# Patient Record
Sex: FEMALE | Race: WHITE | NOT HISPANIC OR LATINO | Employment: FULL TIME | URBAN - METROPOLITAN AREA
[De-identification: names, ages, dates, MRNs, and addresses within clinical notes are randomized per-mention and may not be internally consistent; named-entity substitution may affect disease eponyms.]

---

## 2020-06-01 ENCOUNTER — HOSPITAL ENCOUNTER (EMERGENCY)
Facility: HOSPITAL | Age: 50
Discharge: HOME | End: 2020-06-01
Attending: EMERGENCY MEDICINE
Payer: COMMERCIAL

## 2020-06-01 VITALS
HEART RATE: 91 BPM | HEIGHT: 68 IN | TEMPERATURE: 99 F | RESPIRATION RATE: 18 BRPM | OXYGEN SATURATION: 99 % | WEIGHT: 120 LBS | SYSTOLIC BLOOD PRESSURE: 153 MMHG | BODY MASS INDEX: 18.19 KG/M2 | DIASTOLIC BLOOD PRESSURE: 98 MMHG

## 2020-06-01 DIAGNOSIS — S82.839A AVULSION FRACTURE OF DISTAL END OF FIBULA: Primary | ICD-10-CM

## 2020-06-01 DIAGNOSIS — S99.912A LEFT ANKLE INJURY, INITIAL ENCOUNTER: ICD-10-CM

## 2020-06-01 DIAGNOSIS — S99.922A FOOT INJURY, LEFT, INITIAL ENCOUNTER: ICD-10-CM

## 2020-06-01 PROCEDURE — 99284 EMERGENCY DEPT VISIT MOD MDM: CPT | Mod: ,,, | Performed by: PHYSICIAN ASSISTANT

## 2020-06-01 PROCEDURE — 25000003 PHARM REV CODE 250: Performed by: EMERGENCY MEDICINE

## 2020-06-01 PROCEDURE — 99284 PR EMERGENCY DEPT VISIT,LEVEL IV: ICD-10-PCS | Mod: ,,, | Performed by: PHYSICIAN ASSISTANT

## 2020-06-01 PROCEDURE — 99283 EMERGENCY DEPT VISIT LOW MDM: CPT | Mod: 25

## 2020-06-01 RX ORDER — IBUPROFEN 600 MG/1
600 TABLET ORAL
Status: COMPLETED | OUTPATIENT
Start: 2020-06-01 | End: 2020-06-01

## 2020-06-01 RX ADMIN — IBUPROFEN 600 MG: 600 TABLET, FILM COATED ORAL at 07:06

## 2020-06-01 NOTE — PROVIDER PROGRESS NOTES - EMERGENCY DEPT.
Emergency Department TeleTRIAGE Encounter Note      CHIEF COMPLAINT    Chief Complaint   Patient presents with    Ankle Injury     L ankle missed step,        VITAL SIGNS   Initial Vitals [06/01/20 1838]   BP Pulse Resp Temp SpO2   (!) 155/82 (!) 112 18 99 °F (37.2 °C) 98 %      MAP       --            ALLERGIES    Review of patient's allergies indicates:  No Known Allergies    PROVIDER TRIAGE NOTE  The patient inverted her left foot, injuring her left ankle and left foot.  She has taken nothing for the pain.  There is swelling to the lateral malleolus.      ORDERS  Labs Reviewed - No data to display    ED Orders (720h ago, onward)    Start Ordered     Status Ordering Provider    06/01/20 1845 06/01/20 1844  Ice to affected area  Once      Ordered NEFTALY THOMPSON    06/01/20 1845 06/01/20 1844  Apply ace wrap  Once      Ordered NEFTALY THOMPSON    06/01/20 1845 06/01/20 1844  ibuprofen tablet 600 mg  ED 1 Time      Ordered NEFTALY THOMPSON    06/01/20 1844 06/01/20 1844  X-Ray Ankle Complete Left  1 time imaging      Ordered NEFTALY THOMPSON    06/01/20 1844 06/01/20 1844  X-Ray Foot Complete Left  1 time imaging      Ordered NEFTALY THOMPSON            Virtual Visit Note: The provider triage portion of this emergency department evaluation and documentation was performed via [employer], a HIPAA-compliant telemedicine application, in concert with a tele-presenter in the room. A face to face patient evaluation with one of my colleagues will occur once the patient is placed in an emergency department room.      DISCLAIMER: This note was prepared with [employer] voice recognition transcription software. Garbled syntax, mangled pronouns, and other bizarre constructions may be attributed to that software system.

## 2020-06-02 NOTE — ED PROVIDER NOTES
Encounter Date: 6/1/2020       History     Chief Complaint   Patient presents with    Ankle Injury     L ankle missed step,      Patient is a 50-year-old female with no known past medical history who presents to the emergency department with complaints of left lateral ankle pain that began about 3 hr prior to arrival.  Patient reports that she stepped incorrectly on a plank while walking outside in her barn which caused her ankle to abruptly invert.  She denies fall or head injury.  She is not on blood thinners.  She denies knee pain.  She has not taken any medication prior to arrival.  She reports pain is worse with eversion and inversion of the ankle as well as ambulation.  She denies other worsening or alleviating factors.  She denies fevers, chills, cough, shortness of breath, chest pain, nausea, vomiting, diarrhea.  This is the extent of the patient's complaints.        Review of patient's allergies indicates:  No Known Allergies  No past medical history on file.  No past surgical history on file.  No family history on file.  Social History     Tobacco Use    Smoking status: Not on file   Substance Use Topics    Alcohol use: Not on file    Drug use: Not on file     Review of Systems   Constitutional: Negative for chills and fever.   HENT: Negative for congestion and sore throat.    Respiratory: Negative for cough and shortness of breath.    Cardiovascular: Negative for chest pain.   Gastrointestinal: Negative for diarrhea, nausea and vomiting.   Genitourinary: Negative for dysuria.   Musculoskeletal: Positive for arthralgias. Negative for back pain.   Skin: Negative for rash and wound.   Neurological: Negative for weakness and headaches.   Hematological: Does not bruise/bleed easily.       Physical Exam     Initial Vitals [06/01/20 1838]   BP Pulse Resp Temp SpO2   (!) 155/82 (!) 112 18 99 °F (37.2 °C) 98 %      MAP       --         Physical Exam    Nursing note and vitals reviewed.  Constitutional: She  appears well-developed and well-nourished. She is not diaphoretic. No distress.   HENT:   Head: Normocephalic and atraumatic.   Mouth/Throat: Oropharynx is clear and moist.   Eyes: Conjunctivae and EOM are normal. Pupils are equal, round, and reactive to light.   Neck: Normal range of motion. Neck supple.   Cardiovascular: Normal rate, regular rhythm, normal heart sounds and intact distal pulses. Exam reveals no gallop and no friction rub.    No murmur heard.  Pulmonary/Chest: Breath sounds normal. She has no wheezes. She has no rhonchi. She has no rales.   Abdominal: Soft. Bowel sounds are normal. There is no tenderness.   Musculoskeletal: Normal range of motion. She exhibits edema and tenderness (Tenderness to palpation of anterior lateral malleolus).   Edema and ecchymosis to lateral malleolus   Neurological: She is alert and oriented to person, place, and time. She has normal strength. No cranial nerve deficit or sensory deficit. GCS score is 15. GCS eye subscore is 4. GCS verbal subscore is 5. GCS motor subscore is 6.   Skin: Skin is warm and dry. Capillary refill takes less than 2 seconds.   Psychiatric: She has a normal mood and affect. Her behavior is normal. Judgment and thought content normal.         ED Course   Procedures  Labs Reviewed - No data to display       Imaging Results          X-Ray Foot Complete Left (Final result)  Result time 06/01/20 19:32:31    Final result by Nic Calvert MD (06/01/20 19:32:31)                 Impression:      1. No acute displaced fracture or dislocation of the foot.  2. Cortical irregularity involving the distal aspect of the fibula, not readily apparent on the accompanying ankle radiograph may reflect small fibular avulsion.  Correlation is advised.      Electronically signed by: Nic Calvert MD  Date:    06/01/2020  Time:    19:32             Narrative:    EXAMINATION:  XR FOOT COMPLETE 3 VIEW LEFT    CLINICAL HISTORY:  .  Unspecified injury of left foot,  initial encounter    TECHNIQUE:  AP, lateral and oblique views of the left foot were performed.    COMPARISON:  06/01/2020    FINDINGS:  Three views left foot.    No acute displaced fracture or dislocation of the foot.  No radiopaque foreign body.  There is edema overlying the lateral malleolus.  Not readily apparent on the ankle radiograph, is irregular ossific/calcific density at the distal aspect of the fibula.  Given the edema in the region, small fibular avulsion injury is not excluded.                               X-Ray Ankle Complete Left (Edited Result - FINAL)  Result time 06/01/20 19:33:27    Addendum 1 of 1 by Nic Calvert MD (06/01/20 19:33:27)      Please note, following this dictation, radiograph of the foot became available for review.  On that examination, there is calcific/ossific density along the distal most aspect of the fibula.  Given this, small fibular avulsion injury is not excluded noting edema overlies the region.  Correlation is advised.  Please see separately dictated radiograph of the foot.      Electronically signed by: Nic Calvert MD  Date:    06/01/2020  Time:    19:33               Final result by Nic Calvert MD (06/01/20 19:27:29)                 Impression:      1. No acute displaced fracture or dislocation of the ankle noting nonspecific edema overlying the lateral malleolus.      Electronically signed by: Nic Calvert MD  Date:    06/01/2020  Time:    19:27             Narrative:    EXAMINATION:  XR ANKLE COMPLETE 3 VIEW LEFT    CLINICAL HISTORY:  Unspecified injury of left ankle, initial encounter    TECHNIQUE:  AP, lateral and oblique views of the left ankle were performed.    COMPARISON:  None    FINDINGS:  Three views left ankle.    No acute displaced fracture or dislocation of the ankle.  No radiopaque foreign body.  There is edema overlying the lateral malleolus.                                 Medical Decision Making:   History:   Old Medical Records:  "I decided to obtain old medical records.  Initial Assessment:   Emergent evaluation of a 50-year-old female who presents to the emergency department with complaints of pain to the lateral aspect of her ankle that began about 3 hr prior to arrival.  Patient reports that she stepped incorrectly on a plank of wood outside which caused her ankle to abruptly invert.  She reports hearing a crack."  She has not taken any medication prior to arrival.  Patient is afebrile, hemodynamically stable, and nontoxic appearing.  X-ray foot and ankle ordered by tele triage provider.  Differential Diagnosis:   Differential diagnosis includes but is not limited to fracture, dislocation, musculoskeletal pain, ligamentous injury.  ED Management:  Patient given ibuprofen, ice, compression dressing as ordered by tele triage provider.  X-ray does reveal avulsion fracture of the distal fibula.  I recommend surgical boot and follow-up with ortho in 1 week.  Patient declined surgical boot and would prefer crutches.  I instruct her to weightbear as tolerated.  I will order crutches in the emergency department.  She declines need for narcotic pain medication at home.  She will continue ibuprofen and Tylenol as needed for pain.  Strict return precautions given.  She voices understanding.  All questions answered. The patient was instructed to follow up with Ortho in 7 days or to return to the emergency department for worsening symptoms. The treatment plan was discussed with the patient who demonstrated understanding and comfort with plan. The patient's history, physical exam, and plan of care was discussed with and agreed upon with my supervising physician.                                    Clinical Impression:     1. Avulsion fracture of distal end of fibula    2. Left ankle injury, initial encounter    3. Foot injury, left, initial encounter                ED Disposition Condition    Discharge Stable        ED Prescriptions     None    "     Follow-up Information     Follow up With Specialties Details Why Contact Info Additional Information    [facility] Emergency Medicine Go to  If symptoms worsen [address] [address] [phone]     [facility] Orthopedics In 1 week  [address]  [facility] [address] [phone] [facility] - 5th Floor                                     Brenda Muniz PA-C  06/02/20 0143

## 2020-06-02 NOTE — ED NOTES
Ankle wrapped and crutches given to pt with instructions. Pt instructed to follow up with ortho. Pt states understanding.

## 2020-06-02 NOTE — DISCHARGE INSTRUCTIONS
Please use crutches as needed. Weight bare as tolerated. Take ibuprofen and tylenol as needed for pain. Follow up with ortho in one week or return to the ED for new or worsening symptoms.

## 2020-06-24 ENCOUNTER — TELEPHONE (OUTPATIENT)
Dept: ORTHOPEDICS | Facility: CLINIC | Age: 50
End: 2020-06-24

## 2020-06-24 NOTE — TELEPHONE ENCOUNTER
Received a return call from pt.  Explained that I was able to secure a sooner appointment for her tomorrow morning with Dr Ortiz at [facility].   Pt verbalized understanding  Pt has a cam boot at home   She will bring it with her to the appointment.   Pt has not used crutches provided by ED.   Pt is wrapping ankle

## 2020-06-24 NOTE — TELEPHONE ENCOUNTER
Left voice mail for pt to return my call     Was able to obtain a sooner appointment for pt with Dr Ortiz at [facility].     Pending a return call from pt.

## 2020-06-24 NOTE — TELEPHONE ENCOUNTER
----- Message from Fide Arguello LPN sent at 6/24/2020  1:24 PM CDT -----    ----- Message -----  From: Mehreen Wells  Sent: 6/24/2020  12:37 PM CDT  To: [facility] Triage        Calls in to be seen for a fracture she sustained on 6/11 - appt had been scheduled for today w/Lito Rajput - appt canceled patient was told they dont see for her type of injury and she would need appt at [facility]. 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  Please contact patient to advise @# [phone]     If she needs to keep appt tomorrow I can call her back - just didn't know if you would have wanted to see this info

## 2020-06-25 ENCOUNTER — HOSPITAL ENCOUNTER (OUTPATIENT)
Dept: RADIOLOGY | Facility: HOSPITAL | Age: 50
Discharge: HOME | End: 2020-06-25
Attending: ORTHOPAEDIC SURGERY
Payer: COMMERCIAL

## 2020-06-25 ENCOUNTER — OFFICE VISIT (OUTPATIENT)
Dept: ORTHOPEDICS | Facility: CLINIC | Age: 50
End: 2020-06-25
Payer: COMMERCIAL

## 2020-06-25 DIAGNOSIS — S93.402D INVERSION SPRAIN OF LEFT ANKLE, SUBSEQUENT ENCOUNTER: ICD-10-CM

## 2020-06-25 DIAGNOSIS — M25.572 LEFT ANKLE PAIN, UNSPECIFIED CHRONICITY: ICD-10-CM

## 2020-06-25 DIAGNOSIS — M25.572 LEFT ANKLE PAIN, UNSPECIFIED CHRONICITY: Primary | ICD-10-CM

## 2020-06-25 PROCEDURE — 73610 X-RAY EXAM OF ANKLE: CPT | Mod: TC,LT

## 2020-06-25 PROCEDURE — 73610 XR ANKLE COMPLETE 3 VIEW LEFT: ICD-10-PCS | Mod: 26,LT,, | Performed by: RADIOLOGY

## 2020-06-25 PROCEDURE — 99204 OFFICE O/P NEW MOD 45 MIN: CPT | Mod: S$GLB,,, | Performed by: ORTHOPAEDIC SURGERY

## 2020-06-25 PROCEDURE — 99204 PR OFFICE/OUTPT VISIT, NEW, LEVL IV, 45-59 MIN: ICD-10-PCS | Mod: S$GLB,,, | Performed by: ORTHOPAEDIC SURGERY

## 2020-06-25 PROCEDURE — 73610 X-RAY EXAM OF ANKLE: CPT | Mod: 26,LT,, | Performed by: RADIOLOGY

## 2020-06-25 PROCEDURE — 99999 PR PBB SHADOW E&M-EST. PATIENT-LVL III: ICD-10-PCS | Mod: PBBFAC,,, | Performed by: ORTHOPAEDIC SURGERY

## 2020-06-25 PROCEDURE — 99999 PR PBB SHADOW E&M-EST. PATIENT-LVL III: CPT | Mod: PBBFAC,,, | Performed by: ORTHOPAEDIC SURGERY

## 2020-06-25 NOTE — PATIENT INSTRUCTIONS
Today, you saw Dr. Ortiz and were seen for a left ankle sprain.  Your x-rays were positive for TINY evette fracture.  A review of ankle sprains is provided below.  We discussed that most ankle sprains (even with torn ligaments or tiny pulled off pieces of bone) heal without needing further surgical treatment.  An ankle sprain still needs to be treated and rehabilitated though.  Treatment and rehabilitation progress through phases, which are outlined below.  FIRST, we focus on pain and improving swelling/treating inflammation.  SECOND, we focus on regaining motion.  LASTLY, we work on strengthening, balance and return to activities.  This process can take 2-3 months before you are returning to activities without dysfunction.  In rare cases, progressing through the home exercise program you are provided and returning to activities stalls and a formal referral to physical therapy is needed.    We decided the next step(s) in in treatment is/are   1.  Weight bearing: Weight bearing as tolerated on left lower extremity; increase activities using pain as your guide   2.  Immobilization: Discontinue boot and start using an ankle brace; if the one you ordered doesn't fit, send me message and I can order you one thru [facility] and it is billed to insurance   3.  Therapy: AOFAS ankle sprain protocol exercises provided   o https://www.footcaremd.org/resources/how-to-help/how-to-stretch-your-ankle-after-a-sprain  o https://www.footcaremd.org/resources/how-to-help/how-to-strengthen-your-ankle-after-a-sprain   4.  Symptomatic treatment: Over the counter anti-inflammatories as needed; ice and elevation as needed     Thank you for allowing me to participate in your care.  We will see you back as needed to discuss next steps in treatment if current treatment plan does not provide relief.    WHAT IS AN ANKLE SPRAIN?  An ankle sprain refers to tearing of the ligaments of the ankle. The most common ankle sprain occurs on the  lateral (outside) part of the ankle. There's a good chance you may have sprained your ankle at some point while playing sports or stepping on an uneven surface -- some 25,000 people do it every day. It can happen in the setting of an ankle fracture (when the bones of the ankle also break). Most commonly, however, it occurs in isolation.    Symptoms  Patients report pain after having twisted an ankle. This usually occurs due to an inversion injury, which means the foot rolls underneath the ankle or leg. It commonly occurs during sports. Patients will complain of pain on the outside of their ankle and various degrees of swelling and bruising. Depending on the severity of the sprain, a person may or may not be able to put weight on the foot.    Causes    As noted above, these injuries occur when the ankle is twisted underneath the leg, called inversion. Risk factors are activities, such as jumping/cutting sports like basketball and soccer, in which an athlete can come down on and turn the ankle or step on an opponent's foot.     Some people are predisposed to ankle sprains. These injuries are more common in people with a high arched foot. This is because it is easier to turn on the ankle.    In those who have had a severe sprain in the past, it is also easier to turn the ankle and sustain a new sprain. Therefore, one of the risk factors of spraining the ankle is a history of a previous sprain or instability (looseness in the ankle). Those who have weak muscles, especially the peroneals that run along the outside of the ankle that provide muscular support to the ankle, may be more predisposed.    Anatomy    There are multiple ligaments in the ankle. Ligaments in general are the structures that connect bone to bone. Tendons, on the other hand, connect muscle to bone and allow the muscles to exert force on their associated bones. In the case of an ankle sprain, there are several commonly sprained ligaments. The two most  important are the following:Ligaments in the ankle    The anterior talofibular ligament (ATFL), which connects the talus to the fibula on the outside of the ankle.    The calcaneal fibular ligament (CFL), which connects the fibula to the calcaneus.        Injuries to the above ligaments must be differentiated from the high ankle sprain, which is a more severe type of ankle sprain involving ligaments that connect the tibia (long bone on the inside of the leg) to the fibula (bone on the outside of the leg).    Diagnosis  Ankle sprains can be diagnosed fairly easily given that they are common injuries. Pain on the outside of the ankle, tenderness and swelling, and an ankle with an inversion-type injury may indicate a sprain. In these patients, normal X-rays also suggest that the bone has not been broken and instead the ankle ligaments have been torn or sprained.     It is very important, however, not to simply regard any injury as an ankle sprain because other injuries can occur as well. For example, the peroneal tendons can be torn. There also can be fractures in other bones around the ankle, including the fifth metatarsal or the calcaneus (heel bone). See a foot and ankle orthopaedic surgeon in your area for a thorough examination.    In very severe cases, an MRI may be warranted to rule out other problems in the ankle such as damage to the cartilage. An MRI typically is not necessary to diagnose a sprain and is reserved for patients who are slow to recover and do not follow the normal progression of healing.    Treatments  Surgery is not required in the vast majority of ankle sprains. Even in severe sprains, these ligaments will heal without surgery if treated appropriately. The grade of the sprain will dictate treatment. Sprains are traditionally classified into Grade 1 (mild), Grade 2 (moderate), and Grade 3 (severe) injuries. Perhaps more important, however, is the patient's ability to bear weight. Those that can  bear weight even after the injury are likely to return very quickly to normal activities. Those who cannot walk may need to be immobilized.     Treating your sprained ankle properly may prevent chronic pain and instability. For treatment of ankle sprains, I recommend you follow the R.I.C.E. guidelines:    Rest your ankle by not walking on it until you can do it comfortably (this may require a boot brace or lace up brace).  Ice it to keep the swelling down. Don't put ice directly on the skin (use a thin piece of cloth between the ice bag and skin) and don't ice more than 20 minutes at a time to avoid frostbite.  Compressive bandages immobilize and support your injury.  Elevate your ankle above your heart level for 48 hours. The swelling usually goes down within a few days.    Severe ligament injuries often require rehabilitation. The goals of therapy are to allow for optimal healing of the ligaments, return to sport/work as quickly as possible, and prevent re-injury.  In some cases, a referral to physical therapy is needed.    Recovery  There are 3 phases of recovery:    Phase 1 includes resting, protecting, and reducing swelling of your injured ankle.  Phase 2 includes restoring your ankle's flexibility, range of motion, and strength.  Phase 3 includes gradually returning to straight-ahead activity and doing maintenance exercises, followed later by sport-specific exercises (e.g., sprinting and cutting).    Once you can stand on your ankle again, your doctor will prescribe exercise routines to strengthen your muscles and ligaments, and increase your flexibility, balance, and coordination. Later, you may walk, jog, and run figure-eights with your ankle taped or in a supportive ankle brace.    It's important to complete the rehabilitation program because it makes it less likely that you'll hurt the same ankle again. If you don't complete rehabilitation or if your ligament heals in a stretched-out position and cannot  "perform its normal function, you could suffer chronic pain, instability, and arthritis in your ankle. If your ankle still hurts, it could mean that the sprained ligament or ligaments have not healed right, or that some other injury occurred at the time of the ankle sprain (e.g., cartilage damage or tendon injury).    To prevent future ankle sprains, pay attention to your body's warning signs to slow down when you feel pain or fatigue, and stay in shape with good muscle balance, flexibility, and strength.    FAQs  What is a high ankle sprain and is that different from a regular ankle sprain?  A high ankle sprain refers to tearing of the ligaments that connect the tibia to the fibula (this connection is also called the syndesmosis). These are different and much less common than the standard lateral ankle sprains, meaning those that occur on the side of the ankle.     Do ankle sprains need to be treated with surgery?  Ankle sprains rarely, if ever, needed to be treated with surgery. The vast majority simply need to be treated with rest, ice, compression, and elevation followed by physical therapy and temporary bracing.     I have sprained my ankle many times. Should I be concerned?  The more you sprain an ankle, the greater the chance that problems will develop. For example, turning the ankle can lead to damage to the cartilage inside the ankle joint. You should see your foot and ankle orthopaedic surgeon if this is occurring.       The American Orthopaedic Foot & Ankle Society (AOFAS) offers information on this site as an educational service. The content of [facility], including text, images, and graphics, is for informational purposes only. The content is not intended to substitute for professional medical advice, diagnoses or treatments. If you need medical advice, use the "Find a Surgeon" search to locate a foot and ankle orthopaedic surgeon in your area.    "

## 2020-06-25 NOTE — PROGRESS NOTES
"  Subjective:   Chief complaint:   Chief Complaint   Patient presents with    Left Ankle - Pain       Date of injury: 6/1/2020    HPI:   Areli Fink is a 50 y.o. female who presents today for evaluation of left ankle injury.  Rates pain as 4/10.  Pain has been ongoing for since date of injury.  Inciting event: injury.  Treatments thus far: boot immobilization.    Inversion injury ankle while reaching for something in bar with her horses.  Noted immediate pain and swelling.  Seen in urgent care and told she had "avulsion fracture".  She has been WBAT in boot some but mostly in sneaker.  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  General feels good in the morning, notes increasing pain and swelling over the course the day.  Had small recurrent re-injury last week, but otherwise notes no recurrent instability.  This was her 1st ankle sprain denies numbness or tingling.    ROS:  Musculoskeletal: per HPI  Constitutional: Negative for fever  Cardiovascular: Negative for chest pain  Respiratory: Negative for shortness of breath  Skin: Negative for ulcers or lesions  Neurological: Negative for burning, tingling and numbness  Vascular: Negative for peripheral edema  Heme: Negative for chronic anticoagulation; Negative for history of blood clot  Endocrine: Negative for diabetes  Immune: Negative for inflammatory arthritis  /Nephrology: Negative for ESRD-on hemodialysis       Objective:   Exam:  There were no vitals filed for this visit.  General: No acute distress, well-appearing  Neurologic: Alert and oriented x3  Psychiatric: Appropriate mood and affect, cooperative  Cardiovascular: Regular pulse  Respiratory: Breathing on room air  Skin: No rashes or ulcers  Vascular:  Left foot palpable dorsalis pedis and posterior tibial pulses.  Musculoskeletal:  Left ankle with minimal hindfoot swelling there is small amount of anterolateral swelling centered over the ATFL.  Ankle range of motion is maintained and irritable max " dorsiflexion plantar flexion as well as inversion.  Nontender the proximal fibula.  Nontender over the deltoid.  Achilles and tibialis anterior 5/5 and intact.  4+ out of 5 peroneals and seated in the groove with resisted dorsiflexion eversion.  Nontender the 5th metatarsal base.  Sensation intact light touch in able localize throughout superficial peroneal, deep peroneal, tibial nerve distributions.    Imaging:  Radiographs were ordered, obtained and personally reviewed today.    Standing three views left ankle obtained today and personally reviewed demonstrate maintained a congruent ankle mortise.  There is age-indeterminate tiny evette off the distal fibula no medial clear space widening.  No tibiofibular widening.    Injury radiographs also reviewed of the left ankle.  Again questionable age-indeterminate tiny evette of the distal fibula seen.    Additional records/labs reviewed:  None      Assessment:     1. Inversion sprain of left ankle, subsequent encounter         I reviewed imaging, injury history, and diagnosis as above with the patient. I attempted to use layman's terms to educate the patient as well as utilize foot models and/or pictures.   I personally went through imaging with the patient and reviewed that radiographs are negative for fracture.  As such, I discussed the role for non-operative treatment.  Non-operative treatment for this injury involves: Anti-inflammatory medications or creams, RICE modalities, Braces and/or shoe ware modifications and Stretching program.  I anticipate symptoms should improve with expectant treatment and there is <10% risk of long term symptoms from this injury.       Notably for ankle sprains, discussed the possibility of recurrent instability.  I reviewed that rehabilitation focuses on range of motion and edema control first followed by strengthening and balance work followed by slowly returning to activities.  In rare cases formal physical therapy is needed if  difficulty progressing with home exercise program.  Advanced imaging modalities and possible surgical interventions are reserved for refractory symptoms lasting greater than 3 months.       Plan:       1.  Therapy: AOFAS ankle sprain protocol exercises provided  2.  Symptomatic treatment: OTC NSAIDs recommended and RICE modalities recommended with emphasis on ice and elevation as needed  3.  Restrictions: Advance activity as tolerated, use pain as guide  4.  Brace/orthotics/etc: OTC ASO already obtained  5.  Follow-up: PRN  6.  RTW and activity: ok for rtw without restrictions; discussed caution with uneven surfaced for reininjury      No orders of the defined types were placed in this encounter.      Past Medical History:   Diagnosis Date    Arthritis        Past Surgical History:   Procedure Laterality Date    APPENDECTOMY  2000    CYST REMOVAL  2011       History reviewed. No pertinent family history.    Social History     Socioeconomic History    Marital status:      Spouse name: Not on file    Number of children: Not on file    Years of education: Not on file    Highest education level: Not on file   Occupational History    Not on file   Social Needs    Financial resource strain: Not on file    Food insecurity     Worry: Not on file     Inability: Not on file    Transportation needs     Medical: Not on file     Non-medical: Not on file   Tobacco Use    Smoking status: Never Smoker   Substance and Sexual Activity    Alcohol use: Not on file    Drug use: Not on file    Sexual activity: Not on file   Lifestyle    Physical activity     Days per week: Not on file     Minutes per session: Not on file    Stress: Not on file   Relationships    Social connections     Talks on phone: Not on file     Gets together: Not on file     Attends Cheondoism service: Not on file     Active member of club or organization: Not on file     Attends meetings of clubs or organizations: Not on file      Relationship status: Not on file   Other Topics Concern    Not on file   Social History Narrative    Not on file

## 2020-08-21 ENCOUNTER — HOSPITAL ENCOUNTER (OUTPATIENT)
Dept: CARDIOLOGY | Facility: HOSPITAL | Age: 50
Discharge: HOME | End: 2020-08-21
Attending: NURSE PRACTITIONER
Payer: COMMERCIAL

## 2020-08-21 ENCOUNTER — DOCUMENTATION ONLY (OUTPATIENT)
Dept: CARDIOLOGY | Facility: HOSPITAL | Age: 50
End: 2020-08-21

## 2020-08-21 VITALS
SYSTOLIC BLOOD PRESSURE: 145 MMHG | BODY MASS INDEX: 18.18 KG/M2 | HEART RATE: 78 BPM | HEIGHT: 68 IN | DIASTOLIC BLOOD PRESSURE: 90 MMHG | WEIGHT: 119.94 LBS

## 2020-08-21 DIAGNOSIS — R94.31 ECG ABNORMAL: Primary | ICD-10-CM

## 2020-08-21 LAB
ASCENDING AORTA: 3.58 CM
AV INDEX (PROSTH): 0.89
AV MEAN GRADIENT: 2 MMHG
AV PEAK GRADIENT: 3 MMHG
AV VALVE AREA: 3.4 CM2
AV VELOCITY RATIO: 0.85
BSA FOR ECHO PROCEDURE: 1.62 M2
CV ECHO LV RWT: 0.27 CM
DOP CALC AO PEAK VEL: 0.91 M/S
DOP CALC AO VTI: 18.82 CM
DOP CALC LVOT AREA: 3.8 CM2
DOP CALC LVOT DIAMETER: 2.21 CM
DOP CALC LVOT PEAK VEL: 0.77 M/S
DOP CALC LVOT STROKE VOLUME: 63.99 CM3
DOP CALCLVOT PEAK VEL VTI: 16.69 CM
E WAVE DECELERATION TIME: 195.81 MSEC
E/A RATIO: 1.5
E/E' RATIO: 5.28 M/S
ECHO LV POSTERIOR WALL: 0.58 CM (ref 0.6–1.1)
FRACTIONAL SHORTENING: 28 % (ref 28–44)
INTERVENTRICULAR SEPTUM: 0.62 CM (ref 0.6–1.1)
IVRT: 97.05 MSEC
LA MAJOR: 4.16 CM
LA MINOR: 4.85 CM
LA WIDTH: 4.1 CM
LEFT ATRIUM SIZE: 3.44 CM
LEFT ATRIUM VOLUME INDEX: 32.6 ML/M2
LEFT ATRIUM VOLUME: 53.69 CM3
LEFT INTERNAL DIMENSION IN SYSTOLE: 3.09 CM (ref 2.1–4)
LEFT VENTRICLE DIASTOLIC VOLUME INDEX: 50.97 ML/M2
LEFT VENTRICLE DIASTOLIC VOLUME: 83.83 ML
LEFT VENTRICLE MASS INDEX: 45 G/M2
LEFT VENTRICLE SYSTOLIC VOLUME INDEX: 22.9 ML/M2
LEFT VENTRICLE SYSTOLIC VOLUME: 37.72 ML
LEFT VENTRICULAR INTERNAL DIMENSION IN DIASTOLE: 4.32 CM (ref 3.5–6)
LEFT VENTRICULAR MASS: 73.46 G
LV LATERAL E/E' RATIO: 5.08 M/S
LV SEPTAL E/E' RATIO: 5.5 M/S
MV PEAK A VEL: 0.44 M/S
MV PEAK E VEL: 0.66 M/S
MV STENOSIS PRESSURE HALF TIME: 56.79 MS
MV VALVE AREA P 1/2 METHOD: 3.87 CM2
PISA TR MAX VEL: 1.98 M/S
PULM VEIN S/D RATIO: 0.72
PV PEAK D VEL: 0.6 M/S
PV PEAK S VEL: 0.43 M/S
RA MAJOR: 3.43 CM
RA PRESSURE: 8 MMHG
RA WIDTH: 3.49 CM
RIGHT VENTRICULAR END-DIASTOLIC DIMENSION: 3.3 CM
RV TISSUE DOPPLER FREE WALL SYSTOLIC VELOCITY 1 (APICAL 4 CHAMBER VIEW): 11.97 CM/S
SINUS: 3.74 CM
STJ: 2.99 CM
TDI LATERAL: 0.13 M/S
TDI SEPTAL: 0.12 M/S
TDI: 0.13 M/S
TR MAX PG: 16 MMHG
TRICUSPID ANNULAR PLANE SYSTOLIC EXCURSION: 2.92 CM
TV REST PULMONARY ARTERY PRESSURE: 24 MMHG

## 2020-08-21 PROCEDURE — 93306 ECHO (CUPID ONLY): ICD-10-PCS | Mod: 26,,, | Performed by: INTERNAL MEDICINE

## 2020-08-21 PROCEDURE — 93306 TTE W/DOPPLER COMPLETE: CPT | Mod: 26,,, | Performed by: INTERNAL MEDICINE

## 2020-08-21 PROCEDURE — 93306 TTE W/DOPPLER COMPLETE: CPT

## 2020-08-21 NOTE — PROGRESS NOTES
Outside order received for echo.  [facility] contacted & order clarified to be 2 D Echo w/ CFD.  Order placed in Epic. Pt to be contacted & testing will be scheduled.

## 2020-08-27 ENCOUNTER — TELEPHONE (OUTPATIENT)
Dept: CARDIOLOGY | Facility: CLINIC | Age: 50
End: 2020-08-27

## 2020-08-27 ENCOUNTER — OFFICE VISIT (OUTPATIENT)
Dept: CARDIOLOGY | Facility: CLINIC | Age: 50
End: 2020-08-27
Payer: COMMERCIAL

## 2020-08-27 VITALS
HEART RATE: 74 BPM | BODY MASS INDEX: 18.18 KG/M2 | DIASTOLIC BLOOD PRESSURE: 82 MMHG | WEIGHT: 119.94 LBS | SYSTOLIC BLOOD PRESSURE: 130 MMHG | HEIGHT: 68 IN

## 2020-08-27 DIAGNOSIS — R07.89 CHEST TIGHTNESS: ICD-10-CM

## 2020-08-27 DIAGNOSIS — R00.2 PALPITATIONS: ICD-10-CM

## 2020-08-27 DIAGNOSIS — R03.0 BORDERLINE HYPERTENSION: ICD-10-CM

## 2020-08-27 DIAGNOSIS — R94.31 NONSPECIFIC ABNORMAL ELECTROCARDIOGRAM (ECG) (EKG): Primary | ICD-10-CM

## 2020-08-27 DIAGNOSIS — L40.50 PSORIATIC ARTHRITIS: ICD-10-CM

## 2020-08-27 DIAGNOSIS — R06.09 DYSPNEA ON EXERTION: ICD-10-CM

## 2020-08-27 PROCEDURE — 99999 PR PBB SHADOW E&M-EST. PATIENT-LVL III: ICD-10-PCS | Mod: PBBFAC,,, | Performed by: INTERNAL MEDICINE

## 2020-08-27 PROCEDURE — 3008F PR BODY MASS INDEX (BMI) DOCUMENTED: ICD-10-PCS | Mod: CPTII,S$GLB,, | Performed by: INTERNAL MEDICINE

## 2020-08-27 PROCEDURE — 99999 PR PBB SHADOW E&M-EST. PATIENT-LVL III: CPT | Mod: PBBFAC,,, | Performed by: INTERNAL MEDICINE

## 2020-08-27 PROCEDURE — 3008F BODY MASS INDEX DOCD: CPT | Mod: CPTII,S$GLB,, | Performed by: INTERNAL MEDICINE

## 2020-08-27 PROCEDURE — 99205 OFFICE O/P NEW HI 60 MIN: CPT | Mod: S$GLB,,, | Performed by: INTERNAL MEDICINE

## 2020-08-27 PROCEDURE — 99205 PR OFFICE/OUTPT VISIT, NEW, LEVL V, 60-74 MIN: ICD-10-PCS | Mod: S$GLB,,, | Performed by: INTERNAL MEDICINE

## 2020-08-27 RX ORDER — PANTOPRAZOLE SODIUM 40 MG/1
40 TABLET, DELAYED RELEASE ORAL DAILY
Qty: 30 TABLET | Refills: 11 | Status: SHIPPED | OUTPATIENT
Start: 2020-08-27 | End: 2021-08-27

## 2020-08-27 RX ORDER — ALPRAZOLAM 0.5 MG/1
TABLET ORAL
COMMUNITY
Start: 2020-08-14

## 2020-08-27 NOTE — TELEPHONE ENCOUNTER
Addendum --  I left message on pt's voicemail that blood in stool is an indication for a full evaluation in the emergency room to ascertain the source of bleeding and a check of the blood count, etc.          Patient called wanted Dr Monique to know that when she was in clinic today she thought her blood pressure was elevated because she was having abdominal pain from a spicy breakfast.  States she had bloody diarrhea today after she returned home.  She took a nap and has not had anymore diarrhea. 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 She just wanted Dr Monique to be aware. Advise patient to follow up with her primary care or GI doctor.

## 2020-08-27 NOTE — PROGRESS NOTES
"  Subjective:      Patient ID: Areli Fink is a 50 y.o. female.    Chief Complaint: Abnormal ECG    HPI:  Pt was scheduled for plastic surgery last week by Dr Wilson at [facility].    Pre-op ECG was abnormal    Review of Systems   Cardiovascular: Positive for chest pain, dyspnea on exertion and palpitations. Negative for claudication, irregular heartbeat, leg swelling, near-syncope, orthopnea and syncope.      Pt occasionally feel "bounding pulse" which "jumps back to normal" after 5 or 10 seconds.  Pt has palpitations once or twice a week.    Pt occasionally feels short of breath when lifting weights and when riding her horse and sometimes while sitting.    Pt feels tight in the anterior chest in the mornings sometimes which lasts 15 seconds.      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    Pt takes Aleve for psoriatic arthritis    Pt drinks Coke    Pt had Covid19 in April and has lost most of her sense of taste and has lost 10 lbs    Past Medical History:   Diagnosis Date    Arthritis     Psoriatic arthritis         Past Surgical History:   Procedure Laterality Date    APPENDECTOMY      APPENDECTOMY  2000    CYST REMOVAL  2011       Family History   Problem Relation Age of Onset    Heart disease Mother     Heart attack Mother     Hypertension Mother     Diabetes Mother     Heart disease Father     Hypertension Father     Heart attack Father     Arrhythmia Father     Atrial fibrillation Father     Pacemaker/defibrilator Father     Heart failure Father        Social History     Socioeconomic History    Marital status:      Spouse name: Not on file    Number of children: Not on file    Years of education: Not on file    Highest education level: Not on file   Occupational History    Not on file   Social Needs    " "Financial resource strain: Not on file    Food insecurity     Worry: Not on file     Inability: Not on file    Transportation needs     Medical: Not on file     Non-medical: Not on file   Tobacco Use    Smoking status: Never Smoker    Smokeless tobacco: Never Used   Substance and Sexual Activity    Alcohol use: Yes     Alcohol/week: 10.0 standard drinks     Types: 10 Cans of beer per week     Comment: social    Drug use: No    Sexual activity: Not on file   Lifestyle    Physical activity     Days per week: Not on file     Minutes per session: Not on file    Stress: Not on file   Relationships    Social connections     Talks on phone: Not on file     Gets together: Not on file     Attends Latter-day service: Not on file     Active member of club or organization: Not on file     Attends meetings of clubs or organizations: Not on file     Relationship status: Not on file   Other Topics Concern    Not on file   Social History Narrative    ** Merged History Encounter **            Current Outpatient Medications on File Prior to Visit   Medication Sig Dispense Refill    ALPRAZolam (XANAX) 0.5 MG tablet       ketorolac (TORADOL) 10 mg tablet Take 1 tablet (10 mg total) by mouth 2 (two) times daily as needed for Pain. 15 tablet 1     No current facility-administered medications on file prior to visit.        Review of patient's allergies indicates:  No Known Allergies  Objective:     Vitals:    08/27/20 0830   BP: (!) 153/88   BP Location: Left arm   Patient Position: Sitting   BP Method: Medium (Automatic)   Pulse: 74   Weight: 54.4 kg (119 lb 14.9 oz)   Height: 5' 8" (1.727 m)        Physical Exam   Constitutional: She is oriented to person, place, and time. She appears well-developed and well-nourished.   Eyes: No scleral icterus.   Neck: No JVD present. Carotid bruit is not present.   Cardiovascular: Normal rate and regular rhythm. Exam reveals no gallop.   No murmur heard.  Pulses:       Dorsalis pedis " pulses are 2+ on the right side and 2+ on the left side.        Posterior tibial pulses are 2+ on the right side and 2+ on the left side.   Pulmonary/Chest: Breath sounds normal.   Abdominal: Soft. She exhibits no distension, no abdominal bruit, no pulsatile midline mass and no mass. There is no hepatosplenomegaly. There is no abdominal tenderness.   Musculoskeletal:         General: No edema.   Neurological: She is alert and oriented to person, place, and time.   Skin: Skin is warm and dry.   Psychiatric: She has a normal mood and affect. Her behavior is normal. Judgment and thought content normal.   Vitals reviewed.      ECG done pre-op:  NSR QS pattern V1, V2    Echocardiogram done at the main campus: normal left ventricular systolic function; mild diastolic dysfunction; sclerotic mitral valve; mild TR; normal PAP.    Lab 2/20:  LDL 93  HDL 88  CBC and CMP and TSH WNL  Assessment:     1. Nonspecific abnormal electrocardiogram (ECG) (EKG)    2. Dyspnea on exertion    3. Chest tightness    4. Borderline hypertension    5. Palpitations      Plan:   Areli GASPAR was seen today for abnormal ecg.    Diagnoses and all orders for this visit:    Nonspecific abnormal electrocardiogram (ECG) (EKG)  -     Stress Echo Which stress agent will be used? Treadmill Exercise; Color Flow Doppler? No; Future  -     X-Ray Chest PA And Lateral; Future    Dyspnea on exertion  -     Stress Echo Which stress agent will be used? Treadmill Exercise; Color Flow Doppler? No; Future  -     X-Ray Chest PA And Lateral; Future    Chest tightness  -     Stress Echo Which stress agent will be used? Treadmill Exercise; Color Flow Doppler? No; Future  -     X-Ray Chest PA And Lateral; Future    Borderline hypertension  -     Stress Echo Which stress agent will be used? Treadmill Exercise; Color Flow Doppler? No; Future  -     X-Ray Chest PA And Lateral; Future    Palpitations  -     Stress Echo Which stress agent will be used? Treadmill Exercise; Color  Flow Doppler? No; Future  -     X-Ray Chest PA And Lateral; Future    Other orders  -     pantoprazole (PROTONIX) 40 MG tablet; Take 1 tablet (40 mg total) by mouth once daily.     The ECG is a false positive.  There is no evidence of a septal infarct on echocardiogram.  The chest tightness may be GERD or gastritis from taking NSAID's.  Will give an empiric trial of pantoprazole.   Rheumatology f/u for psoriatic arthritis--Dr Angel Oliver  Will get treadmill stress echo to screen for CAD due to MAYO and chest discomfort and palpitations.  Consider holter monitor if palpitations persist after reducing caffeine intake  Will review recent labs  Monitor BP at home.  RTC if elevated.  F/u with gyn, Dr Enrique  F[facility] P[facility] at [facility]  Get colonoscopy    If stress echo is normal patient is medically stable for plastic surgery    No follow-ups on file.

## 2020-08-27 NOTE — TELEPHONE ENCOUNTER
Returned call to patient.  No answer, left message on voice mail.      ----- Message from Gerri Reyes sent at 8/27/2020 11:00 AM CDT -----  Type:  Needs Medical Advice    Who Called: pt  Advice Regarding: she states she is having a few problems, would not give any infor  Would the patient rather a call back or a response via [facility]? call  Best Call Back Number: [phone]  Additional Information: n/a

## 2020-08-28 ENCOUNTER — TELEPHONE (OUTPATIENT)
Dept: CARDIOLOGY | Facility: CLINIC | Age: 50
End: 2020-08-28

## 2020-08-28 NOTE — TELEPHONE ENCOUNTER
Patient call stating she had missed calls from our office.  Advised patient that Dr Monique did try to reach her yesterday and left a voice mail.  She states she received the voice mail.  She is scheduled for a stress test and advised her that the missed calls were probably from the [employer] [facility] as they have been trying to reach her.  Phone number for the [employer] given to the patient.  She also stated that she has had two normal bowel movements since yesterdays episode and will follow up with GI next week.

## 2020-08-31 ENCOUNTER — TELEPHONE (OUTPATIENT)
Dept: RHEUMATOLOGY | Facility: CLINIC | Age: 50
End: 2020-08-31

## 2020-08-31 NOTE — TELEPHONE ENCOUNTER
Called and spoke with the patient about her referral to rheumatology and she will call when she is ready to schedule

## 2020-09-01 ENCOUNTER — TELEPHONE (OUTPATIENT)
Dept: CARDIOLOGY | Facility: CLINIC | Age: 50
End: 2020-09-01

## 2020-09-01 ENCOUNTER — HOSPITAL ENCOUNTER (OUTPATIENT)
Dept: CARDIOLOGY | Facility: HOSPITAL | Age: 50
Discharge: HOME | End: 2020-09-01
Attending: INTERNAL MEDICINE
Payer: COMMERCIAL

## 2020-09-01 DIAGNOSIS — R06.09 DYSPNEA ON EXERTION: ICD-10-CM

## 2020-09-01 DIAGNOSIS — R00.2 PALPITATIONS: ICD-10-CM

## 2020-09-01 DIAGNOSIS — R94.31 NONSPECIFIC ABNORMAL ELECTROCARDIOGRAM (ECG) (EKG): ICD-10-CM

## 2020-09-01 DIAGNOSIS — R07.89 CHEST TIGHTNESS: ICD-10-CM

## 2020-09-01 DIAGNOSIS — R03.0 BORDERLINE HYPERTENSION: ICD-10-CM

## 2020-09-01 LAB
CV STRESS BASE HR: 71 BPM
DIASTOLIC BLOOD PRESSURE: 82 MMHG
OHS CV CPX 1 MINUTE RECOVERY HEART RATE: 142 BPM
OHS CV CPX 85 PERCENT MAX PREDICTED HEART RATE MALE: 137
OHS CV CPX ESTIMATED METS: 13
OHS CV CPX MAX PREDICTED HEART RATE: 161
OHS CV CPX PATIENT IS FEMALE: 1
OHS CV CPX PATIENT IS MALE: 0
OHS CV CPX PEAK DIASTOLIC BLOOD PRESSURE: 95 MMHG
OHS CV CPX PEAK HEAR RATE: 184 BPM
OHS CV CPX PEAK RATE PRESSURE PRODUCT: NORMAL
OHS CV CPX PEAK SYSTOLIC BLOOD PRESSURE: 183 MMHG
OHS CV CPX PERCENT MAX PREDICTED HEART RATE ACHIEVED: 114
OHS CV CPX RATE PRESSURE PRODUCT PRESENTING: 8946
STRESS ANGINA INDEX: 0
STRESS DUKE TREADMILL SCORE: 7
STRESS ECHO POST EXERCISE DUR MIN: 12 MINUTES
STRESS ECHO POST EXERCISE DUR SEC: 1 SECONDS
STRESS ST DEPRESSION: 1 MM
SYSTOLIC BLOOD PRESSURE: 126 MMHG

## 2020-09-01 PROCEDURE — 93351 STRESS TTE COMPLETE: CPT | Mod: 26,,, | Performed by: INTERNAL MEDICINE

## 2020-09-01 PROCEDURE — 93351 STRESS ECHO (CUPID ONLY): ICD-10-PCS | Mod: 26,,, | Performed by: INTERNAL MEDICINE

## 2020-09-01 PROCEDURE — 93351 STRESS TTE COMPLETE: CPT

## 2020-09-01 NOTE — TELEPHONE ENCOUNTER
I left message on voicemail:  Stress ECG and stress echocardiogram are normal.  Pt is cleared for plastic surgery. I will send note to Dr Wilson.  Rare PAC's and rare PVC's are noted which potentially explain symptoms of palpitations.

## 2020-09-02 ENCOUNTER — TELEPHONE (OUTPATIENT)
Dept: CARDIOLOGY | Facility: CLINIC | Age: 50
End: 2020-09-02

## 2020-09-02 NOTE — TELEPHONE ENCOUNTER
Returned call to patient regarding a missed call.  Dr Monique tried to call her yesterday with test results.  She did not answer and results were left on her voice mail.  She states she received the voice mail and wanted to be sure that results and clearance were sent to both [facility] Fax  and Dr Wilson Fax [phone].  Checked routing history and info was routed to both physicians.

## 2020-10-09 ENCOUNTER — TELEPHONE (OUTPATIENT)
Dept: CARDIOLOGY | Facility: CLINIC | Age: 50
End: 2020-10-09

## 2020-11-16 ENCOUNTER — TELEPHONE (OUTPATIENT)
Dept: RHEUMATOLOGY | Facility: CLINIC | Age: 50
End: 2020-11-16

## 2020-11-16 NOTE — TELEPHONE ENCOUNTER
----- Message from Hannah Simmons MA sent at 11/16/2020  9:41 AM CST -----  Regarding: FW: appointment access    ----- Message -----  From: Jose Pérez  Sent: 11/16/2020   9:25 AM CST  To: [facility] Clinical Support Staff  Subject: appointment access                                called in to speak with someone at the office regarding scheduling an earlier appointment. She would like a call back from the office and can be reached at    [phone]

## 2020-11-16 NOTE — TELEPHONE ENCOUNTER
Called the patient and let her know that the soonest any one had was on the [facility] and so she took the appointment

## 2021-08-12 ENCOUNTER — TELEPHONE (OUTPATIENT)
Dept: CARDIOLOGY | Facility: CLINIC | Age: 51
End: 2021-08-12

## 2022-06-16 DIAGNOSIS — R07.9 CHEST PAIN, UNSPECIFIED TYPE: Primary | ICD-10-CM

## 2022-07-01 ENCOUNTER — HOSPITAL ENCOUNTER (OUTPATIENT)
Dept: CARDIOLOGY | Facility: HOSPITAL | Age: 52
Discharge: HOME | End: 2022-07-01
Attending: PEDIATRICS
Payer: COMMERCIAL

## 2022-07-01 VITALS
WEIGHT: 119 LBS | DIASTOLIC BLOOD PRESSURE: 72 MMHG | SYSTOLIC BLOOD PRESSURE: 122 MMHG | BODY MASS INDEX: 18.04 KG/M2 | HEIGHT: 68 IN | HEART RATE: 64 BPM

## 2022-07-01 DIAGNOSIS — R07.9 CHEST PAIN, UNSPECIFIED TYPE: ICD-10-CM

## 2022-07-01 LAB
ASCENDING AORTA: 2.81 CM
AV INDEX (PROSTH): 0.86
AV MEAN GRADIENT: 2 MMHG
AV PEAK GRADIENT: 3 MMHG
AV VALVE AREA: 2.93 CM2
AV VELOCITY RATIO: 0.92
BSA FOR ECHO PROCEDURE: 1.61 M2
CV ECHO LV RWT: 0.34 CM
DOP CALC AO PEAK VEL: 0.9 M/S
DOP CALC AO VTI: 21.4 CM
DOP CALC LVOT AREA: 3.4 CM2
DOP CALC LVOT DIAMETER: 2.08 CM
DOP CALC LVOT PEAK VEL: 0.83 M/S
DOP CALC LVOT STROKE VOLUME: 62.66 CM3
DOP CALCLVOT PEAK VEL VTI: 18.45 CM
E WAVE DECELERATION TIME: 253.09 MSEC
E/A RATIO: 1.87
E/E' RATIO: 4.67 M/S
ECHO LV POSTERIOR WALL: 0.73 CM (ref 0.6–1.1)
EJECTION FRACTION: 60 %
FRACTIONAL SHORTENING: 33 % (ref 28–44)
INTERVENTRICULAR SEPTUM: 0.66 CM (ref 0.6–1.1)
LA MAJOR: 4.53 CM
LA MINOR: 4.65 CM
LA WIDTH: 3.73 CM
LEFT ATRIUM SIZE: 3.17 CM
LEFT ATRIUM VOLUME INDEX MOD: 29.4 ML/M2
LEFT ATRIUM VOLUME INDEX: 28.1 ML/M2
LEFT ATRIUM VOLUME MOD: 48.25 CM3
LEFT ATRIUM VOLUME: 46.12 CM3
LEFT INTERNAL DIMENSION IN SYSTOLE: 2.88 CM (ref 2.1–4)
LEFT VENTRICLE DIASTOLIC VOLUME INDEX: 51.21 ML/M2
LEFT VENTRICLE DIASTOLIC VOLUME: 83.99 ML
LEFT VENTRICLE MASS INDEX: 54 G/M2
LEFT VENTRICLE SYSTOLIC VOLUME INDEX: 19.3 ML/M2
LEFT VENTRICLE SYSTOLIC VOLUME: 31.6 ML
LEFT VENTRICULAR INTERNAL DIMENSION IN DIASTOLE: 4.32 CM (ref 3.5–6)
LEFT VENTRICULAR MASS: 88.42 G
LV LATERAL E/E' RATIO: 4.31 M/S
LV SEPTAL E/E' RATIO: 5.09 M/S
MV PEAK A VEL: 0.3 M/S
MV PEAK E VEL: 0.56 M/S
MV STENOSIS PRESSURE HALF TIME: 73.39 MS
MV VALVE AREA P 1/2 METHOD: 3 CM2
PISA TR MAX VEL: 1.73 M/S
RA MAJOR: 3.65 CM
RA PRESSURE: 3 MMHG
RA WIDTH: 3.45 CM
RIGHT VENTRICULAR END-DIASTOLIC DIMENSION: 3.52 CM
RV TISSUE DOPPLER FREE WALL SYSTOLIC VELOCITY 1 (APICAL 4 CHAMBER VIEW): 8.28 CM/S
SINUS: 3.56 CM
STJ: 2.99 CM
TDI LATERAL: 0.13 M/S
TDI SEPTAL: 0.11 M/S
TDI: 0.12 M/S
TR MAX PG: 12 MMHG
TRICUSPID ANNULAR PLANE SYSTOLIC EXCURSION: 2.49 CM
TV REST PULMONARY ARTERY PRESSURE: 15 MMHG

## 2022-07-01 PROCEDURE — 93306 ECHO (CUPID ONLY): ICD-10-PCS | Mod: 26,,, | Performed by: INTERNAL MEDICINE

## 2022-07-01 PROCEDURE — 93306 TTE W/DOPPLER COMPLETE: CPT

## 2022-07-01 PROCEDURE — 93306 TTE W/DOPPLER COMPLETE: CPT | Mod: 26,,, | Performed by: INTERNAL MEDICINE

## 2022-08-02 ENCOUNTER — HOSPITAL ENCOUNTER (EMERGENCY)
Facility: HOSPITAL | Age: 52
Discharge: HOME | End: 2022-08-02
Attending: EMERGENCY MEDICINE
Payer: COMMERCIAL

## 2022-08-02 VITALS
SYSTOLIC BLOOD PRESSURE: 164 MMHG | HEART RATE: 84 BPM | BODY MASS INDEX: 18.09 KG/M2 | WEIGHT: 119 LBS | OXYGEN SATURATION: 100 % | RESPIRATION RATE: 16 BRPM | DIASTOLIC BLOOD PRESSURE: 100 MMHG | TEMPERATURE: 98 F

## 2022-08-02 DIAGNOSIS — T23.252A PARTIAL THICKNESS BURN OF PALM OF LEFT HAND, INITIAL ENCOUNTER: Primary | ICD-10-CM

## 2022-08-02 DIAGNOSIS — R03.0 ELEVATED BLOOD PRESSURE READING: ICD-10-CM

## 2022-08-02 PROCEDURE — 99284 EMERGENCY DEPT VISIT MOD MDM: CPT

## 2022-08-02 PROCEDURE — 99284 EMERGENCY DEPT VISIT MOD MDM: CPT | Mod: ,,, | Performed by: PHYSICIAN ASSISTANT

## 2022-08-02 PROCEDURE — 99284 PR EMERGENCY DEPT VISIT,LEVEL IV: ICD-10-PCS | Mod: ,,, | Performed by: PHYSICIAN ASSISTANT

## 2022-08-02 PROCEDURE — 25000003 PHARM REV CODE 250: Performed by: PHYSICIAN ASSISTANT

## 2022-08-02 RX ORDER — NABUMETONE 500 MG/1
500 TABLET, FILM COATED ORAL 2 TIMES DAILY PRN
Qty: 14 TABLET | Refills: 0 | Status: SHIPPED | OUTPATIENT
Start: 2022-08-02 | End: 2022-08-09

## 2022-08-02 RX ORDER — KETOROLAC TROMETHAMINE 10 MG/1
10 TABLET, FILM COATED ORAL
Status: COMPLETED | OUTPATIENT
Start: 2022-08-02 | End: 2022-08-02

## 2022-08-02 RX ORDER — SILVER SULFADIAZINE 10 G/1000G
CREAM TOPICAL 2 TIMES DAILY
Qty: 20 G | Refills: 0 | Status: SHIPPED | OUTPATIENT
Start: 2022-08-02 | End: 2022-08-09

## 2022-08-02 RX ORDER — SILVER SULFADIAZINE 10 G/1000G
1 CREAM TOPICAL
Status: COMPLETED | OUTPATIENT
Start: 2022-08-02 | End: 2022-08-02

## 2022-08-02 RX ADMIN — KETOROLAC TROMETHAMINE 10 MG: 10 TABLET, FILM COATED ORAL at 07:08

## 2022-08-02 RX ADMIN — SILVER SULFADIAZINE 1 TUBE: 10 CREAM TOPICAL at 07:08
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
## 2022-08-18 ENCOUNTER — TELEPHONE (OUTPATIENT)
Dept: SURGERY | Facility: CLINIC | Age: 52
End: 2022-08-18
Payer: COMMERCIAL

## 2022-10-07 ENCOUNTER — TELEPHONE (OUTPATIENT)
Dept: RHEUMATOLOGY | Facility: CLINIC | Age: 52
End: 2022-10-07
Payer: COMMERCIAL

## 2022-10-07 ENCOUNTER — PATIENT MESSAGE (OUTPATIENT)
Dept: RHEUMATOLOGY | Facility: CLINIC | Age: 52
End: 2022-10-07
Payer: COMMERCIAL
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
## 2023-03-17 ENCOUNTER — PATIENT MESSAGE (OUTPATIENT)
Dept: RESEARCH | Facility: HOSPITAL | Age: 53
End: 2023-03-17
Payer: COMMERCIAL

## 2023-05-22 DIAGNOSIS — Z00.00 PHYSICAL EXAM: Primary | ICD-10-CM

## 2023-05-31 ENCOUNTER — HOSPITAL ENCOUNTER (OUTPATIENT)
Dept: CARDIOLOGY | Facility: CLINIC | Age: 53
Discharge: HOME | End: 2023-05-31
Payer: COMMERCIAL

## 2023-05-31 DIAGNOSIS — Z00.00 PHYSICAL EXAM: ICD-10-CM

## 2023-05-31 PROCEDURE — 93010 ELECTROCARDIOGRAM REPORT: CPT | Mod: S$GLB,,, | Performed by: INTERNAL MEDICINE

## 2023-05-31 PROCEDURE — 93010 EKG 12-LEAD: ICD-10-PCS | Mod: S$GLB,,, | Performed by: INTERNAL MEDICINE

## 2024-10-27 ENCOUNTER — HOSPITAL ENCOUNTER (EMERGENCY)
Facility: HOSPITAL | Age: 54
Discharge: HOME | End: 2024-10-27
Attending: EMERGENCY MEDICINE
Payer: COMMERCIAL

## 2024-10-27 VITALS
BODY MASS INDEX: 18.04 KG/M2 | DIASTOLIC BLOOD PRESSURE: 95 MMHG | TEMPERATURE: 99 F | OXYGEN SATURATION: 98 % | WEIGHT: 119 LBS | SYSTOLIC BLOOD PRESSURE: 158 MMHG | RESPIRATION RATE: 18 BRPM | HEART RATE: 95 BPM | HEIGHT: 68 IN

## 2024-10-27 DIAGNOSIS — T14.90XA TRAUMA: ICD-10-CM

## 2024-10-27 DIAGNOSIS — R00.0 TACHYCARDIA: ICD-10-CM

## 2024-10-27 DIAGNOSIS — S89.91XA LEG INJURY, RIGHT, INITIAL ENCOUNTER: Primary | ICD-10-CM

## 2024-10-27 PROCEDURE — 99285 EMERGENCY DEPT VISIT HI MDM: CPT | Mod: 25

## 2024-10-27 PROCEDURE — 96372 THER/PROPH/DIAG INJ SC/IM: CPT | Performed by: EMERGENCY MEDICINE

## 2024-10-27 PROCEDURE — 63600175 PHARM REV CODE 636 W HCPCS: Performed by: EMERGENCY MEDICINE

## 2024-10-27 PROCEDURE — 93005 ELECTROCARDIOGRAM TRACING: CPT

## 2024-10-27 PROCEDURE — 25000003 PHARM REV CODE 250

## 2024-10-27 RX ORDER — OXYCODONE AND ACETAMINOPHEN 5; 325 MG/1; MG/1
1 TABLET ORAL EVERY 6 HOURS PRN
Qty: 12 TABLET | Refills: 0 | Status: SHIPPED | OUTPATIENT
Start: 2024-10-27 | End: 2024-10-30

## 2024-10-27 RX ORDER — ORPHENADRINE CITRATE 30 MG/ML
30 INJECTION INTRAMUSCULAR; INTRAVENOUS
Status: COMPLETED | OUTPATIENT
Start: 2024-10-27 | End: 2024-10-27

## 2024-10-27 RX ORDER — HYDROCODONE BITARTRATE AND ACETAMINOPHEN 5; 325 MG/1; MG/1
1 TABLET ORAL
Status: COMPLETED | OUTPATIENT
Start: 2024-10-27 | End: 2024-10-27

## 2024-10-27 RX ORDER — KETOROLAC TROMETHAMINE 30 MG/ML
15 INJECTION, SOLUTION INTRAMUSCULAR; INTRAVENOUS
Status: COMPLETED | OUTPATIENT
Start: 2024-10-27 | End: 2024-10-27

## 2024-10-27 RX ADMIN — ORPHENADRINE CITRATE 30 MG: 30 INJECTION, SOLUTION INTRAMUSCULAR; INTRAVENOUS at 10:10

## 2024-10-27 RX ADMIN — HYDROCODONE BITARTRATE AND ACETAMINOPHEN 1 TABLET: 5; 325 TABLET ORAL at 07:10

## 2024-10-27 RX ADMIN — KETOROLAC TROMETHAMINE 15 MG: 30 INJECTION, SOLUTION INTRAMUSCULAR; INTRAVENOUS at 10:10

## 2024-10-28 LAB
OHS QRS DURATION: 66 MS
OHS QTC CALCULATION: 435 MS

## 2024-11-01 ENCOUNTER — OFFICE VISIT (OUTPATIENT)
Dept: SPORTS MEDICINE | Facility: CLINIC | Age: 54
End: 2024-11-01
Payer: COMMERCIAL

## 2024-11-01 VITALS
HEIGHT: 68 IN | BODY MASS INDEX: 20.19 KG/M2 | SYSTOLIC BLOOD PRESSURE: 143 MMHG | HEART RATE: 78 BPM | WEIGHT: 133.25 LBS | DIASTOLIC BLOOD PRESSURE: 84 MMHG

## 2024-11-01 DIAGNOSIS — T14.8XXA SUBCUTANEOUS HEMATOMA: Primary | ICD-10-CM

## 2024-11-01 DIAGNOSIS — M25.561 ACUTE PAIN OF RIGHT KNEE: ICD-10-CM

## 2024-11-01 PROCEDURE — 99999 PR PBB SHADOW E&M-EST. PATIENT-LVL III: CPT | Mod: PBBFAC,,, | Performed by: ORTHOPAEDIC SURGERY

## 2024-11-01 RX ORDER — OXYCODONE AND ACETAMINOPHEN 5; 325 MG/1; MG/1
1 TABLET ORAL EVERY 4 HOURS PRN
COMMUNITY

## 2024-11-01 RX ORDER — KETOROLAC TROMETHAMINE 10 MG/1
10 TABLET, FILM COATED ORAL EVERY 6 HOURS
Qty: 3 TABLET | Refills: 0 | Status: SHIPPED | OUTPATIENT
Start: 2024-11-01 | End: 2024-11-02